# Patient Record
Sex: MALE | Race: WHITE | NOT HISPANIC OR LATINO
[De-identification: names, ages, dates, MRNs, and addresses within clinical notes are randomized per-mention and may not be internally consistent; named-entity substitution may affect disease eponyms.]

---

## 2017-04-10 PROBLEM — Z00.00 ENCOUNTER FOR PREVENTIVE HEALTH EXAMINATION: Status: ACTIVE | Noted: 2017-04-10

## 2017-06-15 ENCOUNTER — APPOINTMENT (OUTPATIENT)
Dept: INTERNAL MEDICINE | Facility: CLINIC | Age: 62
End: 2017-06-15

## 2017-06-15 ENCOUNTER — OTHER (OUTPATIENT)
Age: 62
End: 2017-06-15

## 2017-06-15 VITALS
SYSTOLIC BLOOD PRESSURE: 120 MMHG | TEMPERATURE: 98.3 F | WEIGHT: 202 LBS | DIASTOLIC BLOOD PRESSURE: 70 MMHG | OXYGEN SATURATION: 97 % | HEART RATE: 62 BPM | HEIGHT: 72 IN | RESPIRATION RATE: 18 BRPM | BODY MASS INDEX: 27.36 KG/M2

## 2017-06-15 DIAGNOSIS — Z78.9 OTHER SPECIFIED HEALTH STATUS: ICD-10-CM

## 2017-06-15 DIAGNOSIS — M54.9 DORSALGIA, UNSPECIFIED: ICD-10-CM

## 2017-06-15 DIAGNOSIS — Z80.0 FAMILY HISTORY OF MALIGNANT NEOPLASM OF DIGESTIVE ORGANS: ICD-10-CM

## 2017-06-15 DIAGNOSIS — Z82.49 FAMILY HISTORY OF ISCHEMIC HEART DISEASE AND OTHER DISEASES OF THE CIRCULATORY SYSTEM: ICD-10-CM

## 2017-06-15 DIAGNOSIS — M54.41 LUMBAGO WITH SCIATICA, RIGHT SIDE: ICD-10-CM

## 2017-06-15 DIAGNOSIS — Z80.42 FAMILY HISTORY OF MALIGNANT NEOPLASM OF PROSTATE: ICD-10-CM

## 2017-06-15 DIAGNOSIS — Z82.0 FAMILY HISTORY OF EPILEPSY AND OTHER DISEASES OF THE NERVOUS SYSTEM: ICD-10-CM

## 2017-06-15 RX ORDER — METHYLPREDNISOLONE 4 MG/1
4 TABLET ORAL
Qty: 1 | Refills: 0 | Status: COMPLETED | COMMUNITY
Start: 2017-06-15 | End: 2017-06-21

## 2017-06-15 RX ORDER — CEFUROXIME AXETIL 500 MG/1
500 TABLET ORAL
Qty: 8 | Refills: 0 | Status: COMPLETED | COMMUNITY
Start: 2017-03-28

## 2017-06-15 RX ORDER — CARISOPRODOL 250 MG/1
250 TABLET ORAL 3 TIMES DAILY
Qty: 30 | Refills: 0 | Status: COMPLETED | COMMUNITY
Start: 2017-06-15 | End: 2017-06-15

## 2017-06-15 RX ORDER — SILDENAFIL CITRATE 100 MG/1
100 TABLET, FILM COATED ORAL
Qty: 6 | Refills: 0 | Status: COMPLETED | COMMUNITY
Start: 2017-05-16

## 2017-06-15 RX ORDER — TADALAFIL 20 MG/1
20 TABLET, FILM COATED ORAL
Qty: 6 | Refills: 0 | Status: COMPLETED | COMMUNITY
Start: 2017-02-03

## 2017-06-15 RX ORDER — LEVOFLOXACIN 500 MG/1
500 TABLET, FILM COATED ORAL
Qty: 4 | Refills: 0 | Status: COMPLETED | COMMUNITY
Start: 2017-03-28

## 2017-09-18 ENCOUNTER — APPOINTMENT (OUTPATIENT)
Dept: INTERNAL MEDICINE | Facility: CLINIC | Age: 62
End: 2017-09-18
Payer: COMMERCIAL

## 2017-09-18 ENCOUNTER — MED ADMIN CHARGE (OUTPATIENT)
Age: 62
End: 2017-09-18

## 2017-09-18 VITALS
SYSTOLIC BLOOD PRESSURE: 130 MMHG | WEIGHT: 205 LBS | DIASTOLIC BLOOD PRESSURE: 82 MMHG | HEIGHT: 72 IN | OXYGEN SATURATION: 100 % | TEMPERATURE: 98 F | BODY MASS INDEX: 27.77 KG/M2 | HEART RATE: 56 BPM | RESPIRATION RATE: 18 BRPM

## 2017-09-18 DIAGNOSIS — Z23 ENCOUNTER FOR IMMUNIZATION: ICD-10-CM

## 2017-09-18 DIAGNOSIS — R76.11 NONSPECIFIC REACTION TO TUBERCULIN SKIN TEST W/OUT ACTIVE TUBERCULOSIS: ICD-10-CM

## 2017-09-18 DIAGNOSIS — Z00.00 ENCOUNTER FOR GENERAL ADULT MEDICAL EXAMINATION W/OUT ABNORMAL FINDINGS: ICD-10-CM

## 2017-09-18 PROCEDURE — G0008: CPT

## 2017-09-18 PROCEDURE — 90686 IIV4 VACC NO PRSV 0.5 ML IM: CPT | Mod: GA

## 2017-09-18 PROCEDURE — 99396 PREV VISIT EST AGE 40-64: CPT | Mod: 25

## 2017-09-18 RX ORDER — CYCLOBENZAPRINE HYDROCHLORIDE 10 MG/1
10 TABLET, FILM COATED ORAL
Qty: 30 | Refills: 0 | Status: COMPLETED | COMMUNITY
Start: 2017-06-15 | End: 2017-09-18

## 2017-09-18 RX ORDER — ASPIRIN 81 MG/1
TABLET, DELAYED RELEASE ORAL
Refills: 0 | Status: ACTIVE | COMMUNITY

## 2017-09-18 RX ORDER — ROSUVASTATIN CALCIUM 20 MG/1
20 TABLET, FILM COATED ORAL
Qty: 30 | Refills: 0 | Status: ACTIVE | COMMUNITY
Start: 2017-03-21

## 2017-09-21 ENCOUNTER — OTHER (OUTPATIENT)
Age: 62
End: 2017-09-21

## 2017-09-22 ENCOUNTER — OTHER (OUTPATIENT)
Age: 62
End: 2017-09-22

## 2018-01-02 ENCOUNTER — OTHER (OUTPATIENT)
Age: 63
End: 2018-01-02

## 2018-07-27 PROBLEM — Z78.9 ALCOHOL USE: Status: ACTIVE | Noted: 2017-06-15

## 2018-10-03 LAB
CHOLEST SERPL-MCNC: 129
GLUCOSE SERPL-MCNC: 93
HBA1C MFR BLD HPLC: 5.3
HDLC SERPL-MCNC: 44
LDLC SERPL CALC-MCNC: 65
PROSTATE SPECIFIC AG, SERUM: 2.03

## 2019-04-19 ENCOUNTER — RECORD ABSTRACTING (OUTPATIENT)
Age: 64
End: 2019-04-19

## 2019-04-19 DIAGNOSIS — Z80.0 FAMILY HISTORY OF MALIGNANT NEOPLASM OF DIGESTIVE ORGANS: ICD-10-CM

## 2019-04-19 RX ORDER — ELECTROLYTES/DEXTROSE
SOLUTION, ORAL ORAL
Refills: 0 | Status: ACTIVE | COMMUNITY

## 2019-05-14 ENCOUNTER — APPOINTMENT (OUTPATIENT)
Dept: GASTROENTEROLOGY | Facility: CLINIC | Age: 64
End: 2019-05-14

## 2019-07-16 ENCOUNTER — APPOINTMENT (OUTPATIENT)
Dept: ORTHOPEDIC SURGERY | Facility: CLINIC | Age: 64
End: 2019-07-16
Payer: MEDICARE

## 2019-07-16 VITALS
SYSTOLIC BLOOD PRESSURE: 174 MMHG | DIASTOLIC BLOOD PRESSURE: 79 MMHG | BODY MASS INDEX: 28.17 KG/M2 | HEART RATE: 55 BPM | HEIGHT: 72 IN | WEIGHT: 208 LBS

## 2019-07-16 DIAGNOSIS — Z86.39 PERSONAL HISTORY OF OTHER ENDOCRINE, NUTRITIONAL AND METABOLIC DISEASE: ICD-10-CM

## 2019-07-16 DIAGNOSIS — Z85.9 PERSONAL HISTORY OF MALIGNANT NEOPLASM, UNSPECIFIED: ICD-10-CM

## 2019-07-16 PROCEDURE — 99203 OFFICE O/P NEW LOW 30 MIN: CPT

## 2019-07-16 PROCEDURE — 73600 X-RAY EXAM OF ANKLE: CPT | Mod: TC,RT

## 2019-07-17 LAB
ALBUMIN SERPL ELPH-MCNC: 4.7 G/DL
ALP BLD-CCNC: 60 U/L
ALT SERPL-CCNC: 23 U/L
ANION GAP SERPL CALC-SCNC: 17 MMOL/L
APTT BLD: 30.5 SEC
AST SERPL-CCNC: 27 U/L
BASOPHILS # BLD AUTO: 0.03 K/UL
BASOPHILS NFR BLD AUTO: 0.4 %
BILIRUB SERPL-MCNC: 0.4 MG/DL
BUN SERPL-MCNC: 17 MG/DL
CALCIUM SERPL-MCNC: 8.9 MG/DL
CHLORIDE SERPL-SCNC: 106 MMOL/L
CO2 SERPL-SCNC: 20 MMOL/L
CREAT SERPL-MCNC: 1.17 MG/DL
EOSINOPHIL # BLD AUTO: 0.16 K/UL
EOSINOPHIL NFR BLD AUTO: 2.4 %
GLUCOSE SERPL-MCNC: 77 MG/DL
HCT VFR BLD CALC: 40.8 %
HGB BLD-MCNC: 12.9 G/DL
IMM GRANULOCYTES NFR BLD AUTO: 0.3 %
INR PPP: 1.01 RATIO
LYMPHOCYTES # BLD AUTO: 1.27 K/UL
LYMPHOCYTES NFR BLD AUTO: 18.8 %
MAN DIFF?: NORMAL
MCHC RBC-ENTMCNC: 30 PG
MCHC RBC-ENTMCNC: 31.6 GM/DL
MCV RBC AUTO: 94.9 FL
MONOCYTES # BLD AUTO: 0.65 K/UL
MONOCYTES NFR BLD AUTO: 9.6 %
NEUTROPHILS # BLD AUTO: 4.64 K/UL
NEUTROPHILS NFR BLD AUTO: 68.5 %
PLATELET # BLD AUTO: 246 K/UL
POTASSIUM SERPL-SCNC: 4.7 MMOL/L
PROT SERPL-MCNC: 7.3 G/DL
PT BLD: 11.6 SEC
RBC # BLD: 4.3 M/UL
RBC # FLD: 13.2 %
SODIUM SERPL-SCNC: 143 MMOL/L
WBC # FLD AUTO: 6.77 K/UL

## 2019-07-18 ENCOUNTER — APPOINTMENT (OUTPATIENT)
Dept: INTERNAL MEDICINE | Facility: CLINIC | Age: 64
End: 2019-07-18
Payer: MEDICARE

## 2019-07-18 ENCOUNTER — APPOINTMENT (OUTPATIENT)
Dept: ORTHOPEDIC SURGERY | Facility: CLINIC | Age: 64
End: 2019-07-18
Payer: MEDICARE

## 2019-07-18 ENCOUNTER — NON-APPOINTMENT (OUTPATIENT)
Age: 64
End: 2019-07-18

## 2019-07-18 VITALS
OXYGEN SATURATION: 97 % | HEART RATE: 66 BPM | RESPIRATION RATE: 16 BRPM | TEMPERATURE: 98.7 F | HEIGHT: 72 IN | DIASTOLIC BLOOD PRESSURE: 76 MMHG | WEIGHT: 211 LBS | SYSTOLIC BLOOD PRESSURE: 144 MMHG | BODY MASS INDEX: 28.58 KG/M2

## 2019-07-18 VITALS
TEMPERATURE: 98.1 F | WEIGHT: 211 LBS | BODY MASS INDEX: 28.58 KG/M2 | SYSTOLIC BLOOD PRESSURE: 161 MMHG | HEIGHT: 72 IN | DIASTOLIC BLOOD PRESSURE: 76 MMHG | HEART RATE: 55 BPM

## 2019-07-18 DIAGNOSIS — I25.10 ATHEROSCLEROTIC HEART DISEASE OF NATIVE CORONARY ARTERY W/OUT ANGINA PECTORIS: ICD-10-CM

## 2019-07-18 DIAGNOSIS — I10 ESSENTIAL (PRIMARY) HYPERTENSION: ICD-10-CM

## 2019-07-18 DIAGNOSIS — R06.02 SHORTNESS OF BREATH: ICD-10-CM

## 2019-07-18 DIAGNOSIS — D64.9 ANEMIA, UNSPECIFIED: ICD-10-CM

## 2019-07-18 DIAGNOSIS — S86.011A STRAIN OF RIGHT ACHILLES TENDON, INITIAL ENCOUNTER: ICD-10-CM

## 2019-07-18 DIAGNOSIS — E78.00 PURE HYPERCHOLESTEROLEMIA, UNSPECIFIED: ICD-10-CM

## 2019-07-18 DIAGNOSIS — J45.909 UNSPECIFIED ASTHMA, UNCOMPLICATED: ICD-10-CM

## 2019-07-18 PROCEDURE — 99215 OFFICE O/P EST HI 40 MIN: CPT | Mod: 25

## 2019-07-18 PROCEDURE — 99213 OFFICE O/P EST LOW 20 MIN: CPT

## 2019-07-18 PROCEDURE — 94060 EVALUATION OF WHEEZING: CPT

## 2019-07-18 NOTE — DATA REVIEWED
[FreeTextEntry1] : Spirometric analysis is within normal limits. Slight bronchodilator reactivity is demonstrated.\par \par Preoperative blood work is reviewed.\par \par EKG performed on 5/2/19 is reviewed. The patient is noted to have sinus bradycardia at a rate of 59. There are normal intervals. There is a slight left axis deviation. There are no acute changes seen.\par \par

## 2019-07-18 NOTE — PLAN
[FreeTextEntry1] : 1. Continued medication as outlined above.\par \par 2. The patient will followup in the near future for his yearly physical and wellness evaluation. He will need to have all of his vaccines updated.\par \par 3. Colonoscopy will be performed later in the year by Dr. reno.\par \par 4. Routine urologic followup with Dr. Nguyen\par \par 5. Await recommendations from Dr. Burton regarding the right Achilles tendon\par \par Addendum: There are no absolute medical contraindications to the planned procedure.

## 2019-07-18 NOTE — PHYSICAL EXAM
[No Acute Distress] : no acute distress [Well Nourished] : well nourished [Well Developed] : well developed [Well-Appearing] : well-appearing [Normal Sclera/Conjunctiva] : normal sclera/conjunctiva [PERRL] : pupils equal round and reactive to light [Normal Outer Ear/Nose] : the outer ears and nose were normal in appearance [Normal Oropharynx] : the oropharynx was normal [No JVD] : no jugular venous distention [No Lymphadenopathy] : no lymphadenopathy [Supple] : supple [No Respiratory Distress] : no respiratory distress  [No Accessory Muscle Use] : no accessory muscle use [Clear to Auscultation] : lungs were clear to auscultation bilaterally [Normal Rate] : normal rate  [Regular Rhythm] : with a regular rhythm [Normal S1, S2] : normal S1 and S2 [No Murmur] : no murmur heard [No Edema] : there was no peripheral edema [No Extremity Clubbing/Cyanosis] : no extremity clubbing/cyanosis [Soft] : abdomen soft [Non Tender] : non-tender [Non-distended] : non-distended [No Masses] : no abdominal mass palpated [No HSM] : no HSM [Normal Bowel Sounds] : normal bowel sounds [Normal Supraclavicular Nodes] : no supraclavicular lymphadenopathy [Normal Posterior Cervical Nodes] : no posterior cervical lymphadenopathy [Normal Anterior Cervical Nodes] : no anterior cervical lymphadenopathy [No CVA Tenderness] : no CVA  tenderness [No Spinal Tenderness] : no spinal tenderness [No Joint Swelling] : no joint swelling [No Rash] : no rash [Coordination Grossly Intact] : coordination grossly intact [No Focal Deficits] : no focal deficits [Normal Gait] : normal gait [Deep Tendon Reflexes (DTR)] : deep tendon reflexes were 2+ and symmetric [Normal Affect] : the affect was normal [Normal Insight/Judgement] : insight and judgment were intact [de-identified] : There is decreased range of motion of the right foot with discomfort in the Achilles region.

## 2019-07-23 RX ORDER — OXYCODONE AND ACETAMINOPHEN 5; 325 MG/1; MG/1
5-325 TABLET ORAL
Qty: 40 | Refills: 0 | Status: ACTIVE | COMMUNITY
Start: 2019-07-23 | End: 1900-01-01

## 2019-07-23 RX ORDER — FENTANYL CITRATE 50 UG/ML
50 INJECTION INTRAVENOUS
Refills: 0 | Status: DISCONTINUED | OUTPATIENT
Start: 2019-07-24 | End: 2019-07-24

## 2019-07-23 RX ORDER — SODIUM CHLORIDE 9 MG/ML
1000 INJECTION, SOLUTION INTRAVENOUS
Refills: 0 | Status: DISCONTINUED | OUTPATIENT
Start: 2019-07-24 | End: 2019-07-24

## 2019-07-23 RX ORDER — ONDANSETRON 8 MG/1
4 TABLET, FILM COATED ORAL ONCE
Refills: 0 | Status: DISCONTINUED | OUTPATIENT
Start: 2019-07-24 | End: 2019-07-24

## 2019-07-23 NOTE — DISCUSSION/SUMMARY
[de-identified] : The patient presents with a right Achilles tendon rupture.  The patient is going to be set up for a surgical procedure and follow up here two weeks after.  The surgical procedure is going to be discussed by Dr. Burton via telephone.\par \par The patient has been advised that their blood pressure today was outside normal ranges and the patient was instructed accordingly. Our Blood Pressure Monitoring Sheet with instructions was given to the patient.\par

## 2019-07-23 NOTE — ADDENDUM
[FreeTextEntry1] : This note was written by Brenda Van on 07/19/2019 acting as scribe for Kitty Rivas, GENEVIEVER/ERMIAS, PA.\par

## 2019-07-23 NOTE — PHYSICAL EXAM
[de-identified] : Right ankle:\par Ankle: Range of Motion in Degrees:\par 	                                Claimant:	                Normal:	\par Dorsiflexion (Active)	40-degrees	40-degrees	\par Dorsiflexion (Passive)	40-degrees	40-degrees	\par Plantar (Active)	                40-degrees	40-degrees	\par Plantar (Passive)	                40-degrees	40-degrees	\par Inversion (Active)	                30-degrees	30-degrees	\par Inversion (Passive)	                30-degrees	30-degrees	\par Eversion  (Active)	                20-degrees	20-degrees	\par Eversion (Passive) 	                20-degrees	20-degrees	\par \par No weakness in dorsiflexion, plantar flexion, inversion or eversion.  Normal sensation.  Positive tenderness in the posterior tendon.  Diffuse ecchymosis and swelling.  Positive Barone’s test with a palpable defect in the mid Achilles.  No tenderness over the medial or lateral ligaments.  No tenderness over the DLES.  No evidence of instability.  No medial or lateral bony tenderness.  No proximal fibular tenderness.  No anterior, medial or lateral tendon tenderness.  No intra-articular swelling.  No tenderness over the plantar aspect of the os calcis.  Negative anterior drawer sign.  Skin is intact.  No rashes, scars or lesions. \par \par No major calf tenderness.  He does have some swelling.  Good distal pulses\par Left ankle:\par Ankle: Range of Motion in Degrees:\par 	                                Claimant:	                Normal:	\par Dorsiflexion (Active)	40-degrees	40-degrees	\par Dorsiflexion (Passive)	40-degrees	40-degrees	\par Plantar (Active)	                40-degrees	40-degrees	\par Plantar (Passive)	                40-degrees	40-degrees	\par Inversion (Active)	                30-degrees	30-degrees	\par Inversion (Passive)	                30-degrees	30-degrees	\par Eversion  (Active)	                20-degrees	20-degrees	\par Eversion (Passive) 	                20-degrees	20-degrees	\par \par No weakness in dorsiflexion, plantar flexion, inversion or eversion.  Normal sensation.  No tenderness over the medial or lateral ligaments.  No tenderness over the DLES.  No evidence of instability.  Negative anterior drawer sign.  No medial or lateral bony tenderness.  No proximal fibular tenderness.  No anterior, posterior, medial or lateral tendon tenderness.  No intra-articular swelling.  No extra-articular swelling, edema or tenderness.  No tenderness over the plantar aspect of the os calcis.  2+ DP and PT pulses. Skin is intact.  No rashes, scars or lesions.  \par   [de-identified] : Gait: Slightly antalgic to antalgic gait.  Station: Normal  [de-identified] : Appearance: Well-developed, well-nourished male  in no acute distress.  [de-identified] : X-ray examination, two views of the right ankle, reveals there is a heel spur.  No acute fractures or dislocations noted.

## 2019-07-23 NOTE — HISTORY OF PRESENT ILLNESS
[1] : the ailment interference is 1/10 [9] : the ailment interference is 9/10 [10  (interferes completely)] : the ailment interference is10/10 (interferes completely) [de-identified] : Bjorn comes in today status post an injury playing pickleball.  He states that he stepped the wrong way and he heard a pop behind his leg.  The patient states the onset/injury occurred on 7/13/19.  This injury is not work related or due to an automobile accident.  The patient states the pain is intermittent and localized.  The patient describes the pain as dull. The patient states rest makes the symptoms better while walking makes the symptoms worse. [] : No

## 2019-07-24 ENCOUNTER — APPOINTMENT (OUTPATIENT)
Dept: ORTHOPEDIC SURGERY | Facility: HOSPITAL | Age: 64
End: 2019-07-24

## 2019-07-24 ENCOUNTER — OUTPATIENT (OUTPATIENT)
Dept: OUTPATIENT SERVICES | Facility: HOSPITAL | Age: 64
LOS: 1 days | Discharge: HOME | End: 2019-07-24
Payer: MEDICARE

## 2019-07-24 VITALS
DIASTOLIC BLOOD PRESSURE: 77 MMHG | HEART RATE: 61 BPM | TEMPERATURE: 98 F | SYSTOLIC BLOOD PRESSURE: 167 MMHG | OXYGEN SATURATION: 100 % | RESPIRATION RATE: 12 BRPM

## 2019-07-24 VITALS
SYSTOLIC BLOOD PRESSURE: 158 MMHG | TEMPERATURE: 98 F | OXYGEN SATURATION: 99 % | RESPIRATION RATE: 16 BRPM | DIASTOLIC BLOOD PRESSURE: 74 MMHG | WEIGHT: 210.98 LBS | HEART RATE: 58 BPM | HEIGHT: 72 IN

## 2019-07-24 DIAGNOSIS — Z98.890 OTHER SPECIFIED POSTPROCEDURAL STATES: Chronic | ICD-10-CM

## 2019-07-24 DIAGNOSIS — S86.011A STRAIN OF RIGHT ACHILLES TENDON, INITIAL ENCOUNTER: ICD-10-CM

## 2019-07-24 LAB
ANION GAP SERPL CALC-SCNC: 6 MMOL/L — SIGNIFICANT CHANGE UP (ref 5–17)
APTT BLD: 31.5 SEC — SIGNIFICANT CHANGE UP (ref 27.5–36.3)
BUN SERPL-MCNC: 17 MG/DL — SIGNIFICANT CHANGE UP (ref 7–23)
CALCIUM SERPL-MCNC: 9 MG/DL — SIGNIFICANT CHANGE UP (ref 8.5–10.1)
CHLORIDE SERPL-SCNC: 108 MMOL/L — SIGNIFICANT CHANGE UP (ref 96–108)
CO2 SERPL-SCNC: 27 MMOL/L — SIGNIFICANT CHANGE UP (ref 22–31)
CREAT SERPL-MCNC: 1.28 MG/DL — SIGNIFICANT CHANGE UP (ref 0.5–1.3)
GLUCOSE SERPL-MCNC: 90 MG/DL — SIGNIFICANT CHANGE UP (ref 70–99)
HCT VFR BLD CALC: 39.8 % — SIGNIFICANT CHANGE UP (ref 39–50)
HGB BLD-MCNC: 13.1 G/DL — SIGNIFICANT CHANGE UP (ref 13–17)
INR BLD: 1.08 RATIO — SIGNIFICANT CHANGE UP (ref 0.88–1.16)
MCHC RBC-ENTMCNC: 29.5 PG — SIGNIFICANT CHANGE UP (ref 27–34)
MCHC RBC-ENTMCNC: 32.9 GM/DL — SIGNIFICANT CHANGE UP (ref 32–36)
MCV RBC AUTO: 89.6 FL — SIGNIFICANT CHANGE UP (ref 80–100)
PLATELET # BLD AUTO: 236 K/UL — SIGNIFICANT CHANGE UP (ref 150–400)
POTASSIUM SERPL-MCNC: 4.1 MMOL/L — SIGNIFICANT CHANGE UP (ref 3.5–5.3)
POTASSIUM SERPL-SCNC: 4.1 MMOL/L — SIGNIFICANT CHANGE UP (ref 3.5–5.3)
PROTHROM AB SERPL-ACNC: 12 SEC — SIGNIFICANT CHANGE UP (ref 10–12.9)
RBC # BLD: 4.44 M/UL — SIGNIFICANT CHANGE UP (ref 4.2–5.8)
RBC # FLD: 12.7 % — SIGNIFICANT CHANGE UP (ref 10.3–14.5)
SODIUM SERPL-SCNC: 141 MMOL/L — SIGNIFICANT CHANGE UP (ref 135–145)
WBC # BLD: 6.61 K/UL — SIGNIFICANT CHANGE UP (ref 3.8–10.5)
WBC # FLD AUTO: 6.61 K/UL — SIGNIFICANT CHANGE UP (ref 3.8–10.5)

## 2019-07-24 PROCEDURE — 93010 ELECTROCARDIOGRAM REPORT: CPT

## 2019-07-24 PROCEDURE — 27650 REPAIR ACHILLES TENDON: CPT | Mod: RT

## 2019-07-24 RX ORDER — ASPIRIN/CALCIUM CARB/MAGNESIUM 324 MG
1 TABLET ORAL
Qty: 0 | Refills: 0 | DISCHARGE

## 2019-07-24 RX ORDER — OXYCODONE HYDROCHLORIDE 5 MG/1
10 TABLET ORAL ONCE
Refills: 0 | Status: DISCONTINUED | OUTPATIENT
Start: 2019-07-24 | End: 2019-07-24

## 2019-07-24 RX ORDER — ROSUVASTATIN CALCIUM 5 MG/1
1 TABLET ORAL
Qty: 0 | Refills: 0 | DISCHARGE

## 2019-07-24 RX ORDER — ASPIRIN/CALCIUM CARB/MAGNESIUM 324 MG
1 TABLET ORAL
Qty: 21 | Refills: 0
Start: 2019-07-24 | End: 2019-08-13

## 2019-07-24 RX ADMIN — SODIUM CHLORIDE 75 MILLILITER(S): 9 INJECTION, SOLUTION INTRAVENOUS at 11:58

## 2019-07-24 RX ADMIN — OXYCODONE HYDROCHLORIDE 10 MILLIGRAM(S): 5 TABLET ORAL at 12:24

## 2019-07-24 RX ADMIN — OXYCODONE HYDROCHLORIDE 10 MILLIGRAM(S): 5 TABLET ORAL at 13:51

## 2019-07-24 NOTE — ASU DISCHARGE PLAN (ADULT/PEDIATRIC) - ACTIVITY LEVEL
Non weight bearing right lower extremity in posterior splint No excercise/Non weight bearing right lower extremity in posterior splint

## 2019-07-24 NOTE — ASU PATIENT PROFILE, ADULT - PSH
H/O left inguinal hernia repair  2002  History of repair of right rotator cuff  2001  S/P rotator cuff repair  left  5/8/2019

## 2019-07-24 NOTE — ASU DISCHARGE PLAN (ADULT/PEDIATRIC) - ASU DC SPECIAL INSTRUCTIONSFT
Follow up with Dr Burton in 7 days.   Call office for appointment.   Take medications as prescribed.   Take ASPIRIN 325 mg daily for 21 days, DO NOT SKIP DOSES, to prevent blood clots    Non weight bearing of the right lower extremity in posterior splint, with assistive devices as needed  Staples to be removed in the office    Keep splint clean, dry, and intact. Rest, ice, and elevate affected extremity. Follow up with Dr Burton in 7 days.   Call office for appointment.   Take medications as prescribed.   Take ASPIRIN 325 mg daily for 21 days, DO NOT SKIP DOSES, to prevent blood clots  May continue aspirin 81 once daily aspirin prescription is completed    Non weight bearing of the right lower extremity in posterior splint, with assistive devices as needed  Staples to be removed in the office    Keep splint clean, dry, and intact. Rest, ice, and elevate affected extremity.

## 2019-07-24 NOTE — ASU DISCHARGE PLAN (ADULT/PEDIATRIC) - CALL YOUR DOCTOR IF YOU HAVE ANY OF THE FOLLOWING:
Pain not relieved by Medications/Wound/Surgical Site with redness, or foul smelling discharge or pus/Numbness, tingling, color or temperature change to extremity/Swelling that gets worse/Bleeding that does not stop

## 2019-07-24 NOTE — ASU DISCHARGE PLAN (ADULT/PEDIATRIC) - CARE PROVIDER_API CALL
Bar Burton)  Orthopaedic Surgery  [address] [place] [address]  Phone: [phone]  Fax: [phone]  Follow Up Time:

## 2019-07-25 PROBLEM — E78.5 HYPERLIPIDEMIA, UNSPECIFIED: Chronic | Status: ACTIVE | Noted: 2019-07-24

## 2019-07-26 NOTE — PHYSICAL EXAM
[de-identified] : Right Achilles:\par There is a palpable defect distal third of his right  Achilles.  Positive Barone test.  No gross neurologic or vascular deficits distally.  \par \par Left ankle:\par Ankle: Range of Motion in Degrees:\par 	                                Claimant:	                Normal:	\par Dorsiflexion (Active)	40-degrees	40-degrees	\par Dorsiflexion (Passive)	40-degrees	40-degrees	\par Plantar (Active)	                40-degrees	40-degrees	\par Plantar (Passive)	                40-degrees	40-degrees	\par Inversion (Active)	                30-degrees	30-degrees	\par Inversion (Passive)	                30-degrees	30-degrees	\par Eversion  (Active)	                20-degrees	20-degrees	\par Eversion (Passive) 	                20-degrees	20-degrees	\par \par No weakness in dorsiflexion, plantar flexion, inversion or eversion.  Normal sensation.  No tenderness over the medial or lateral ligaments.  No tenderness over the DLES.  No evidence of instability.  Negative anterior drawer sign.  No medial or lateral bony tenderness.  No proximal fibular tenderness.  No anterior, posterior, medial or lateral tendon tenderness.  No intra-articular swelling.  No extra-articular swelling, edema or tenderness.  No tenderness over the plantar aspect of the os calcis.  2+ DP and PT pulses. Skin is intact.  No rashes, scars or lesions.  \par   [de-identified] : Gait: Slightly antalgic to antalgic gait.  Station: Normal  [de-identified] : Appearance: Well-developed, well-nourished male  in no acute distress.

## 2019-07-26 NOTE — DISCUSSION/SUMMARY
[de-identified] : The patient presents with a right Achilles tendon tear.  At this time I have recommended that he get an MRI prior to surgical intervention just to assess how distal the tear is to help with our surgical plan.  All the risks and benefits of the surgery, including, but not limited to, anesthesia, infection, nerve and/or vascular injury and need for further surgery, as well as poor wound healing and infection rate is higher with an Achilles tendon repair, were all discussed with the patient at length.  In light of these, he wishes to proceed.  He will be scheduled at this time. \par \par The patient has been advised that their blood pressure today was outside normal ranges and the patient was instructed accordingly. Our Blood Pressure Monitoring Sheet with instructions was given to the patient.\par

## 2019-07-26 NOTE — HISTORY OF PRESENT ILLNESS
[de-identified] : Bjorn comes in today.  He has been seen and indicated with his Achilles tendon rupture.

## 2019-07-26 NOTE — ADDENDUM
[FreeTextEntry1] : This note was written by Brenda Van on 07/23/2019 acting as scribe for Bar Burton III, MD
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
## 2019-07-30 DIAGNOSIS — S86.011A STRAIN OF RIGHT ACHILLES TENDON, INITIAL ENCOUNTER: ICD-10-CM

## 2019-07-30 DIAGNOSIS — S76.111A STRAIN OF RIGHT QUADRICEPS MUSCLE, FASCIA AND TENDON, INITIAL ENCOUNTER: ICD-10-CM

## 2019-07-30 DIAGNOSIS — E78.00 PURE HYPERCHOLESTEROLEMIA, UNSPECIFIED: ICD-10-CM

## 2019-07-30 DIAGNOSIS — I10 ESSENTIAL (PRIMARY) HYPERTENSION: ICD-10-CM

## 2019-07-30 DIAGNOSIS — Z85.9 PERSONAL HISTORY OF MALIGNANT NEOPLASM, UNSPECIFIED: ICD-10-CM
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
## 2019-07-30 DIAGNOSIS — Z79.82 LONG TERM (CURRENT) USE OF ASPIRIN: ICD-10-CM

## 2019-07-30 DIAGNOSIS — D64.9 ANEMIA, UNSPECIFIED: ICD-10-CM

## 2019-07-30 DIAGNOSIS — I25.10 ATHEROSCLEROTIC HEART DISEASE OF NATIVE CORONARY ARTERY WITHOUT ANGINA PECTORIS: ICD-10-CM

## 2019-07-30 DIAGNOSIS — J45.909 UNSPECIFIED ASTHMA, UNCOMPLICATED: ICD-10-CM
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
## 2019-07-31 ENCOUNTER — APPOINTMENT (OUTPATIENT)
Dept: ORTHOPEDIC SURGERY | Facility: CLINIC | Age: 64
End: 2019-07-31
Payer: MEDICARE

## 2019-07-31 VITALS
WEIGHT: 210 LBS | TEMPERATURE: 98.1 F | HEIGHT: 72 IN | SYSTOLIC BLOOD PRESSURE: 170 MMHG | DIASTOLIC BLOOD PRESSURE: 71 MMHG | BODY MASS INDEX: 28.44 KG/M2 | HEART RATE: 61 BPM

## 2019-07-31 PROCEDURE — 99024 POSTOP FOLLOW-UP VISIT: CPT

## 2019-08-08 ENCOUNTER — APPOINTMENT (OUTPATIENT)
Dept: ORTHOPEDIC SURGERY | Facility: CLINIC | Age: 64
End: 2019-08-08
Payer: MEDICARE

## 2019-08-08 VITALS — BODY MASS INDEX: 28.44 KG/M2 | WEIGHT: 210 LBS | HEIGHT: 72 IN

## 2019-08-08 PROCEDURE — 99024 POSTOP FOLLOW-UP VISIT: CPT

## 2019-08-12 NOTE — HISTORY OF PRESENT ILLNESS
[de-identified] : Status post right Achilles tendon repair [de-identified] : ROSIE DALAL is a 64-year-old male who comes in today status post a right Achilles tendon repair on 7/24/19.  He states he is feeling much better. [de-identified] : Right Ankle: \par Wound is healing with no sign of infection.  Sutures are removed. [de-identified] : Status post right Achilles tendon repair [de-identified] : Patient is doing well status post right Achilles tendon repair.  At this time, he will be placed in a CAM boot, non-weightbearing.  He will be reassessed in two weeks.

## 2019-08-12 NOTE — ADDENDUM
[FreeTextEntry1] : This note was written by Yuli Felder on 08/11/2019 acting as scribe for Bar Burton III, MD\par

## 2019-08-22 ENCOUNTER — APPOINTMENT (OUTPATIENT)
Dept: ORTHOPEDIC SURGERY | Facility: CLINIC | Age: 64
End: 2019-08-22
Payer: MEDICARE

## 2019-08-22 VITALS
SYSTOLIC BLOOD PRESSURE: 163 MMHG | HEART RATE: 53 BPM | WEIGHT: 210 LBS | DIASTOLIC BLOOD PRESSURE: 76 MMHG | BODY MASS INDEX: 28.44 KG/M2 | TEMPERATURE: 98.6 F | HEIGHT: 72 IN

## 2019-08-22 DIAGNOSIS — M70.41 PREPATELLAR BURSITIS, RIGHT KNEE: ICD-10-CM

## 2019-08-22 PROCEDURE — 99024 POSTOP FOLLOW-UP VISIT: CPT

## 2019-08-29 NOTE — HISTORY OF PRESENT ILLNESS
[de-identified] : The patient is status post right Achilles tendon repair on 07/24/19.  She states he feels great. [de-identified] : Postop Right Ankle [de-identified] : Right Ankle:\par Wound is well-healed with no signs of infection.  Achilles is intact.   [de-identified] : Status post right Achilles tendon repair  [de-identified] : The patient is progressing nicely status post right Achilles tendon repair.  At this time, I instructed the patient on range of motion and strengthening exercises.  He will be nonweightbearing for two weeks.  \par \par The patient has been advised that their blood pressure today was outside normal ranges and the patient was instructed accordingly. Our Blood Pressure Monitoring Sheet with instructions was given to the patient.\par

## 2019-08-29 NOTE — ADDENDUM
[FreeTextEntry1] : This note was written by Yenni Juárez on 08/27/2019 acting as scribe for Bar Burton III, MD

## 2019-09-03 ENCOUNTER — APPOINTMENT (OUTPATIENT)
Dept: ORTHOPEDIC SURGERY | Facility: CLINIC | Age: 64
End: 2019-09-03
Payer: MEDICARE

## 2019-09-03 VITALS
HEIGHT: 72 IN | WEIGHT: 210 LBS | BODY MASS INDEX: 28.44 KG/M2 | TEMPERATURE: 98 F | SYSTOLIC BLOOD PRESSURE: 168 MMHG | DIASTOLIC BLOOD PRESSURE: 79 MMHG | HEART RATE: 58 BPM

## 2019-09-03 PROCEDURE — 99024 POSTOP FOLLOW-UP VISIT: CPT

## 2019-09-10 NOTE — HISTORY OF PRESENT ILLNESS
[de-identified] : The patient comes in today status post right Achilles tendon repair.  The patient is approximately 6 weeks out.  He is doing excellent. [de-identified] : Post-op Right Ankle [de-identified] : Right Ankle:  The incision is clean, dry and intact.  No signs of infection.  He has good distal pulses.  No calf tenderness.   [de-identified] : Status post right Achilles tendon repair [de-identified] : At this time, since the patient is doing well status post right Achilles tendon repair, the patient is going to be weaned out of the boot over the next 2 weeks, start physical therapy and return to the office in 2 weeks.\par \par The patient has been advised that their blood pressure today was outside normal ranges and the patient was instructed accordingly. Our Blood Pressure Monitoring Sheet with instructions was given to the patient.\par

## 2019-09-10 NOTE — ADDENDUM
[FreeTextEntry1] : This note was written by Barron Hernandez on 09/10/2019, acting as a scribe for MARY BENTON, PERRI/ERMIAS PA

## 2019-09-18 ENCOUNTER — APPOINTMENT (OUTPATIENT)
Dept: ORTHOPEDIC SURGERY | Facility: CLINIC | Age: 64
End: 2019-09-18
Payer: MEDICARE

## 2019-09-18 VITALS
HEIGHT: 72 IN | HEART RATE: 55 BPM | WEIGHT: 214 LBS | SYSTOLIC BLOOD PRESSURE: 180 MMHG | TEMPERATURE: 98.2 F | DIASTOLIC BLOOD PRESSURE: 75 MMHG | BODY MASS INDEX: 28.99 KG/M2

## 2019-09-18 PROCEDURE — 99024 POSTOP FOLLOW-UP VISIT: CPT

## 2019-09-23 NOTE — HISTORY OF PRESENT ILLNESS
[de-identified] : Bjorn comes in today status post a right Achilles repair.  He is one-week for a wound check. [de-identified] : Status post right Achilles tendon repair [de-identified] : Right ankle:\par The wound is healing well.  No sign of infection.   [de-identified] : The patient is doing well status post right Achilles tendon repair.  At this time continue his current modalities.  He will be reassessed in one week for suture removal.\par \par The patient has been advised that their blood pressure today was outside normal ranges and the patient was instructed accordingly. Our Blood Pressure Monitoring Sheet with instructions was given to the patient.\par   [de-identified] : Status post right Achilles tendon repair.

## 2019-09-23 NOTE — ADDENDUM
[FreeTextEntry1] : This note was written by Brenda Van on 08/05/2019 acting as scribe for Bar Burton III, MD \par \par Addendum 9/20/19 - A long CAM boot was prescribed on 7/31/19.

## 2019-09-26 NOTE — HISTORY OF PRESENT ILLNESS
[de-identified] : Postop Right Ankle [de-identified] : The patient comes in today status post right Achilles tendon repair.  He states he is feeling great. [de-identified] : Right Ankle:  Range of Motion in Degrees:\par 	                                Claimant:	                Normal:	\par Dorsiflexion (Active)	40-degrees	40-degrees	\par Dorsiflexion (Passive)	40-degrees	40-degrees	\par Plantar (Active)	                40-degrees	40-degrees	\par Plantar (Passive)	                40-degrees	40-degrees	\par Inversion (Active)	                30-degrees	30-degrees	\par Inversion (Passive)	                30-degrees	30-degrees	\par Eversion  (Active)	                20-degrees	20-degrees	\par Eversion (Passive) 	                20-degrees	20-degrees	\par \par Wounds are well-healed.  Tendon is intact.  Mild weakness and atrophy.  [de-identified] : The patient is progressing well status post right Achilles tendon repair.  At this time, the patient will continue therapy and be reassessed in six weeks.\par \par The patient has been advised that their blood pressure today was outside normal ranges and the patient was instructed accordingly. Our Blood Pressure Monitoring Sheet with instructions was given to the patient.\par   [de-identified] : Status post right Achilles tendon repair

## 2019-09-26 NOTE — ADDENDUM
[FreeTextEntry1] : This note was written by Yenni Juárez on 09/26/2019 acting as scribe for Bar Burton III, MD

## 2019-10-16 ENCOUNTER — APPOINTMENT (OUTPATIENT)
Dept: ORTHOPEDIC SURGERY | Facility: CLINIC | Age: 64
End: 2019-10-16
Payer: MEDICARE

## 2019-10-16 VITALS
HEIGHT: 72 IN | SYSTOLIC BLOOD PRESSURE: 129 MMHG | DIASTOLIC BLOOD PRESSURE: 74 MMHG | WEIGHT: 211 LBS | BODY MASS INDEX: 28.58 KG/M2 | HEART RATE: 61 BPM | TEMPERATURE: 98 F

## 2019-10-16 DIAGNOSIS — Z98.890 OTHER SPECIFIED POSTPROCEDURAL STATES: ICD-10-CM

## 2019-10-16 DIAGNOSIS — M25.561 PAIN IN RIGHT KNEE: ICD-10-CM

## 2019-10-16 PROCEDURE — 99214 OFFICE O/P EST MOD 30 MIN: CPT | Mod: 24

## 2019-10-16 PROCEDURE — 73560 X-RAY EXAM OF KNEE 1 OR 2: CPT | Mod: RT

## 2019-10-23 ENCOUNTER — APPOINTMENT (OUTPATIENT)
Dept: ORTHOPEDIC SURGERY | Facility: CLINIC | Age: 64
End: 2019-10-23
Payer: MEDICARE

## 2019-10-23 VITALS
HEART RATE: 57 BPM | BODY MASS INDEX: 28.44 KG/M2 | TEMPERATURE: 98.2 F | DIASTOLIC BLOOD PRESSURE: 74 MMHG | SYSTOLIC BLOOD PRESSURE: 151 MMHG | WEIGHT: 210 LBS | HEIGHT: 72 IN

## 2019-10-23 DIAGNOSIS — S72.434A: ICD-10-CM

## 2019-10-23 PROCEDURE — 99213 OFFICE O/P EST LOW 20 MIN: CPT

## 2019-10-24 PROBLEM — Z98.890 S/P ACHILLES TENDON REPAIR: Status: ACTIVE | Noted: 2019-07-31

## 2019-10-24 NOTE — REASON FOR VISIT
[FreeTextEntry2] : a postop visit for his right Achilles tendon and a new concern for his right knee

## 2019-10-24 NOTE — PHYSICAL EXAM
[de-identified] : Left Knee: \par Range of Motion in Degrees	\par 	                  Claimant/Normal:\par 		\par Flexion Active            135                        135-degrees\par Flexion Passive	  135	                135-degrees	\par Extension Active	  0-5	                0-5-degrees	\par Extension Passive	  0-5	                0-5-degrees	\par \par No weakness to flexion/extension.  No evidence of instability in the AP plane or varus or valgus stress.  Negative  Lachman.  Negative pivot shift.  Negative anterior drawer test.  Negative posterior drawer test.  Negative Aroldo.  Negative Apley grind.  No medial or lateral joint line tenderness.  No tenderness over the medial and lateral facet of the patella.  No patellofemoral crepitations.  No lateral tilting patella.  No patellar apprehension.  No crepitation in the medial and lateral femoral condyle.  No proximal or distal swelling, edema or tenderness.  No gross motor or sensory deficits.  No intra-articular swelling.  2+ DP and PT pulses. No varus or valgus malalignment.  Skin is intact.  No rashes, scars or lesions.  \par  \par Right Knee: \par Range of Motion in Degrees	\par 	                  Claimant:	Normal:	\par Flexion Active	  135 	                135-degrees	\par Flexion Passive	  135	                135-degrees	\par Extension Active	  0-5	                0-5-degrees	\par Extension Passive	  0-5	                0-5-degrees	\par \par No weakness to flexion/extension.  No evidence of instability in the AP plane or varus or valgus stress.  Negative  Lachman.  Negative pivot shift.  Negative anterior drawer test.  Negative posterior drawer test.  Positive medial joint line tenderness.  Negative lateral joint line tenderness.  Positive Aroldo.  Positive Apley grind.  No tenderness over the medial and lateral facet of the patella.  No patellofemoral crepitations.  No lateral tilting patella.  No patella apprehension.  No crepitation in the medial and lateral femoral condyle.  No proximal or distal swelling, edema or tenderness.  No gross motor or sensory deficits.  Mild intra-articular swelling.  2+ DP and PT pulses.  No varus or valgus malalignment.  Skin is intact.  No rashes, scars or lesions.   \par  \par Right Ankle: \par Range of Motion in Degrees:\par 	 Claimant:	 Normal:	\par Dorsiflexion (Active)	40-degrees	40-degrees	\par Dorsiflexion (Passive)	40-degrees	40-degrees	\par Plantar (Active)	 40-degrees	40-degrees	\par Plantar (Passive)	 40-degrees	40-degrees	\par Inversion (Active)	 30-degrees	30-degrees	\par Inversion (Passive)	 30-degrees	30-degrees	\par Eversion (Active)	 20-degrees	20-degrees	\par Eversion (Passive) 	 20-degrees	20-degrees	\par \par Wounds are well-healed. Tendon is intact.\par  [de-identified] : General Appearance:  Well-developed, well-nourished male in no acute distress. \par  [de-identified] : Ambulating with a slightly antalgic to antalgic gait.  Station:  Normal.  [de-identified] : Radiographs, two views of the right knee, show mild degenerative change.

## 2019-10-24 NOTE — HISTORY OF PRESENT ILLNESS
[de-identified] : The patient comes in today.  He is status post a right Achilles tendon repair on 7/24/19.  He states his right ankle is feeling great.  He is having some increasing complaints of pain to the knee catching, popping and locking on the right.   [None] : The patient is currently asymptomatic [FreeTextEntry1] : Timmy is a 13 year old boy with insomnia and delayed sleep phase syndrome. Has been taking RemFresh melatonin and it worked for a few weeks but now wakes up in middle of the night and can't go back to sleep. Takes melatonin around 9:00 pm and wakes up between 2-3 in the morning and cant go back to sleep. He does take a 3-4 hour nap during the day and he still feels tired when he gets up. Also taking 2 mg Abilify.\danielle Was seen by ortho because has been having leg pain for many years. 8 years ago had an MRI- overstress on bones and growing pains. Wants info about RLS. \par When he wakes up in the morning his legs hurt and if stays still for long his legs hurt more.\par He still has nightmares but not as frequent.

## 2019-10-24 NOTE — ADDENDUM
[FreeTextEntry1] : This note was written by Yuli Felder on 10/24/2019 acting as scribe for Bar Burton III, MD

## 2019-10-24 NOTE — DISCUSSION/SUMMARY
[de-identified] : At this time, I have recommended an MRI of the right knee to assess for a meniscal tear.  He will be reassessed after the MRI.

## 2019-10-31 NOTE — DISCUSSION/SUMMARY
[de-identified] : At this time, due to subchondral fracture of the medial femoral condyle of the right knee, I recommended a Playmaker brace, weight bearing as tolerated.  He will be reassessed in four to six weeks.\par \par The patient was prescribed a rigid support Playmaker knee brace with range of motion joints.  The brace will safely protect the patient and help to facilitate healing by stabilizing and controlling the knee.  In order to prevent skin breakdown along bony prominences and to avoid compression of the peroneal nerve, a custom fit is necessary.\par  \par The patient has been advised that their blood pressure today was outside normal ranges and the patient was instructed accordingly. Our Blood Pressure Monitoring Sheet with instructions was given to the patient.\par

## 2019-10-31 NOTE — ADDENDUM
[FreeTextEntry1] : This note was written by Bonnie Chan on 10/30/2019 acting as a scribe for ROMAINE GARCIA III, MD

## 2019-10-31 NOTE — PHYSICAL EXAM
[de-identified] : Right Knee: \par Range of Motion in Degrees	\par 	  Claimant:	Normal:	\par Flexion Active	 135 	 135-degrees	\par Flexion Passive	 135	 135-degrees	\par Extension Active	 0-5	 0-5-degrees	\par Extension Passive	 0-5	 0-5-degrees	\par \par No weakness to flexion/extension. No evidence of instability in the AP plane or varus or valgus stress. Negative Lachman. Negative pivot shift. Negative anterior drawer test. Negative posterior drawer test. Positive medial joint line tenderness. Negative lateral joint line tenderness. Positive Aroldo. Positive Apley grind. No tenderness over the medial and lateral facet of the patella. No patellofemoral crepitations. No lateral tilting patella. No patella apprehension. No crepitation in the medial and lateral femoral condyle. No proximal or distal swelling, edema or tenderness. No gross motor or sensory deficits. Mild intra-articular swelling. 2+ DP and PT pulses. No varus or valgus malalignment. Skin is intact. No rashes, scars or lesions. \par   [de-identified] : Ambulating with a slightly antalgic to antalgic gait.  Station:  Normal.  [de-identified] : Appearance:  Well-developed, well-nourished male in no acute distress.\par \par \par  [de-identified] : Review of his MRI is consistent with subchondral fracture of the medial femoral condyle.

## 2019-11-18 ENCOUNTER — APPOINTMENT (OUTPATIENT)
Dept: ORTHOPEDIC SURGERY | Facility: CLINIC | Age: 64
End: 2019-11-18
Payer: MEDICARE

## 2019-11-18 VITALS
SYSTOLIC BLOOD PRESSURE: 121 MMHG | HEART RATE: 62 BPM | DIASTOLIC BLOOD PRESSURE: 72 MMHG | TEMPERATURE: 98.5 F | HEIGHT: 72 IN | WEIGHT: 210 LBS | BODY MASS INDEX: 28.44 KG/M2

## 2019-11-18 DIAGNOSIS — S72.434D: ICD-10-CM

## 2019-11-18 PROCEDURE — 99212 OFFICE O/P EST SF 10 MIN: CPT

## 2019-11-21 NOTE — PHYSICAL EXAM
[de-identified] : Right Knee: \par Range of Motion in Degrees	\par 	                  Claimant:	Normal:	\par Flexion Active	  135 	                135-degrees	\par Flexion Passive	  135	                135-degrees	\par Extension Active	  0-5	                0-5-degrees	\par Extension Passive	  0-5	                0-5-degrees	\par \par No weakness to flexion/extension.  No evidence of instability in the AP plane or varus or valgus stress.  Negative  Lachman.  Negative pivot shift.  Negative anterior drawer test.  Negative posterior drawer test.  Negative Aroldo.  Negative Apley grind.  No medial or lateral joint line tenderness.  No tenderness over the medial and lateral facet of the patella.  No patellofemoral crepitations.  No lateral tilting patella.  No patellar apprehension.  No crepitation in the medial and lateral femoral condyle.  No proximal or distal swelling, edema or tenderness.  No gross motor or sensory deficits.  No intra-articular swelling.  2+ DP and PT pulses. No varus or valgus malalignment.  Skin is intact.  No rashes, scars or lesions.  \par   [de-identified] : Ambulating with a slightly antalgic to antalgic gait.  Station:  Normal.  [de-identified] : Appearance:  Well-developed, well-nourished male in no acute distress.\par \par

## 2019-11-21 NOTE — ADDENDUM
[FreeTextEntry1] : This note was written by Bonnie Chan on 11/20/2019 acting as a scribe for ROMAINE GARCIA III, MD

## 2019-11-21 NOTE — DISCUSSION/SUMMARY
[de-identified] : At this time, since the patient is doing well with the resolving fracture of the medial femoral condyle of the right knee, he will return to full activities.  He will discontinue his brace.  He will follow up on an as needed basis.

## 2019-12-05 ENCOUNTER — APPOINTMENT (OUTPATIENT)
Dept: GASTROENTEROLOGY | Facility: CLINIC | Age: 64
End: 2019-12-05
Payer: MEDICARE

## 2019-12-05 VITALS
DIASTOLIC BLOOD PRESSURE: 88 MMHG | HEART RATE: 63 BPM | BODY MASS INDEX: 28.44 KG/M2 | HEIGHT: 72 IN | SYSTOLIC BLOOD PRESSURE: 155 MMHG | WEIGHT: 210 LBS

## 2019-12-05 DIAGNOSIS — K63.5 POLYP OF COLON: ICD-10-CM

## 2019-12-05 PROCEDURE — 99203 OFFICE O/P NEW LOW 30 MIN: CPT

## 2019-12-05 RX ORDER — SODIUM SULFATE, POTASSIUM SULFATE, MAGNESIUM SULFATE 17.5; 3.13; 1.6 G/ML; G/ML; G/ML
17.5-3.13-1.6 SOLUTION, CONCENTRATE ORAL
Qty: 1 | Refills: 0 | Status: ACTIVE | COMMUNITY
Start: 2019-12-05 | End: 1900-01-01

## 2019-12-05 RX ORDER — ASPIRIN 325 MG/1
325 TABLET ORAL
Qty: 21 | Refills: 0 | Status: ACTIVE | COMMUNITY
Start: 2019-07-24

## 2019-12-05 RX ORDER — LOSARTAN POTASSIUM 50 MG/1
50 TABLET, FILM COATED ORAL
Qty: 90 | Refills: 0 | Status: ACTIVE | COMMUNITY
Start: 2019-09-20

## 2019-12-05 RX ORDER — LOSARTAN POTASSIUM 100 MG/1
100 TABLET, FILM COATED ORAL
Qty: 90 | Refills: 0 | Status: ACTIVE | COMMUNITY
Start: 2019-10-18

## 2019-12-05 NOTE — HISTORY OF PRESENT ILLNESS
[FreeTextEntry1] : Patient has a history of colon polyps here for followup colonoscopy he denies any change of bowel habits or rectal bleeding he does have from time to time epigastric burning especially with exercise the cardiac and pulmonary workup or unrevealing he denies dysphagia or significant alcohol use he denies significant nonsteroidal anti-inflammatory drug use but has excessive belching

## 2019-12-05 NOTE — PHYSICAL EXAM
[General Appearance - In No Acute Distress] : in no acute distress [General Appearance - Alert] : alert [Sclera] : the sclera and conjunctiva were normal [PERRL With Normal Accommodation] : pupils were equal in size, round, and reactive to light [Outer Ear] : the ears and nose were normal in appearance [Extraocular Movements] : extraocular movements were intact [Oropharynx] : the oropharynx was normal [Neck Appearance] : the appearance of the neck was normal [Neck Cervical Mass (___cm)] : no neck mass was observed [Jugular Venous Distention Increased] : there was no jugular-venous distention [Thyroid Diffuse Enlargement] : the thyroid was not enlarged [Thyroid Nodule] : there were no palpable thyroid nodules [Heart Rate And Rhythm] : heart rate was normal and rhythm regular [Heart Sounds] : normal S1 and S2 [Auscultation Breath Sounds / Voice Sounds] : lungs were clear to auscultation bilaterally [Heart Sounds Gallop] : no gallops [Murmurs] : no murmurs [Bowel Sounds] : normal bowel sounds [Heart Sounds Pericardial Friction Rub] : no pericardial rub [Abdomen Soft] : soft [Abdomen Mass (___ Cm)] : no abdominal mass palpated [Abdomen Tenderness] : non-tender [Cervical Lymph Nodes Enlarged Posterior Bilaterally] : posterior cervical [Cervical Lymph Nodes Enlarged Anterior Bilaterally] : anterior cervical [Supraclavicular Lymph Nodes Enlarged Bilaterally] : supraclavicular [Axillary Lymph Nodes Enlarged Bilaterally] : axillary [Abnormal Walk] : normal gait [Femoral Lymph Nodes Enlarged Bilaterally] : femoral [Inguinal Lymph Nodes Enlarged Bilaterally] : inguinal [Nail Clubbing] : no clubbing  or cyanosis of the fingernails [Musculoskeletal - Swelling] : no joint swelling seen [Skin Color & Pigmentation] : normal skin color and pigmentation [Motor Tone] : muscle strength and tone were normal [Skin Turgor] : normal skin turgor [] : no rash [Deep Tendon Reflexes (DTR)] : deep tendon reflexes were 2+ and symmetric [Sensation] : the sensory exam was normal to light touch and pinprick [Oriented To Time, Place, And Person] : oriented to person, place, and time [No Focal Deficits] : no focal deficits [Impaired Insight] : insight and judgment were intact [Affect] : the affect was normal

## 2019-12-05 NOTE — ASSESSMENT
[FreeTextEntry1] : #1 history of colon polyps we'll schedule colonoscopy #2 epigastric burning discomfort we'll schedule upper endoscopy the same time

## 2019-12-11 ENCOUNTER — APPOINTMENT (OUTPATIENT)
Dept: GASTROENTEROLOGY | Facility: AMBULATORY MEDICAL SERVICES | Age: 64
End: 2019-12-11
Payer: MEDICARE

## 2019-12-11 ENCOUNTER — RESULT REVIEW (OUTPATIENT)
Age: 64
End: 2019-12-11

## 2019-12-11 PROCEDURE — 45382 COLONOSCOPY W/CONTROL BLEED: CPT | Mod: 59

## 2019-12-11 PROCEDURE — 45385 COLONOSCOPY W/LESION REMOVAL: CPT

## 2019-12-11 PROCEDURE — 45380 COLONOSCOPY AND BIOPSY: CPT | Mod: 59

## 2019-12-11 PROCEDURE — 43239 EGD BIOPSY SINGLE/MULTIPLE: CPT

## 2021-09-30 NOTE — HISTORY OF PRESENT ILLNESS
[de-identified] : The patient comes in today for his right knee.  He is starting to feel a little bit better.  We reviewed his MRI, as noted below.
No

## 2021-12-07 ENCOUNTER — APPOINTMENT (OUTPATIENT)
Dept: INTERNAL MEDICINE | Facility: CLINIC | Age: 66
End: 2021-12-07

## 2022-04-28 ENCOUNTER — APPOINTMENT (OUTPATIENT)
Dept: ORTHOPEDIC SURGERY | Facility: CLINIC | Age: 67
End: 2022-04-28
Payer: MEDICARE

## 2022-04-28 VITALS — HEIGHT: 72 IN | BODY MASS INDEX: 28.44 KG/M2 | WEIGHT: 210 LBS

## 2022-04-28 DIAGNOSIS — M75.01 ADHESIVE CAPSULITIS OF RIGHT SHOULDER: ICD-10-CM

## 2022-04-28 PROCEDURE — 99214 OFFICE O/P EST MOD 30 MIN: CPT

## 2022-04-28 PROCEDURE — 73030 X-RAY EXAM OF SHOULDER: CPT | Mod: RT

## 2022-04-28 NOTE — HISTORY OF PRESENT ILLNESS
[de-identified] : The patient presents today with continued complaints of right shoulder pain. He had a RCR 20 years ago and always had a degree of decreased ROM but for the past year he has significant pain that is interfering with his activities. NSAIDs help, he takes them sparingly. He has previously done physical therapy and is doing a HEP 4-5 days a week. In the past he has tried cortisone injections and oral steroids without relief of symptoms.

## 2022-04-28 NOTE — PHYSICAL EXAM
[5 ___] : forward flexion 5[unfilled]/5 [4___] : external rotation 4[unfilled]/5 [5___] : internal rotation 5[unfilled]/5 [de-identified] : Constitutional: The general appearance of the patient is well developed, well nourished, no deformities and well groomed. Normal\par \par  Gait: Gait and function is as follows: normal gait.\par \par  Skin: Head and neck visualized skin is normal. Left upper extremity visualized skin is normal. Right upper extremity visualized skin is normal. \par \par  Cardiovascular: palpable radial pulse bilaterally. [Supine] : supine [Standing] : standing [Moderate] : moderate [] : negative drop-arm test [Right] : right shoulder [FreeTextEntry8] : No tenderness at the AC joint, biceps tendon or acromion  [FreeTextEntry9] : passive IR at 90 degrees is 50 degrees\par passive ER at 90 degrees is 20 degrees\par passive ER at 90 abduction is 10 [de-identified] : supraspinatus 4 out of 5 with pain [de-identified] : + DAVID's\par  [FreeTextEntry1] : Right shoulder: Two labral vs RCR anchors intact. 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 No fractures.  [TWNoteComboBox7] : active forward flexion 120 degrees [TWNoteComboBox4] : passive forward flexion 100 degrees [de-identified] : active abduction 90 degrees [TWNoteComboBox9] : passive abduction 90 degrees [TWNoteComboBox6] : internal rotation L3 [de-identified] : external rotation 30 degrees [de-identified] : False

## 2022-04-28 NOTE — REASON FOR VISIT
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 He has no evidence of recurrent RCT\par The patient is also s/p a left shoulder two anchor rotator cuff repair with subacromial decompression and arthroscopic extensive debridement of the anterior and superior labrum from 5/8/19.

## 2022-04-28 NOTE — DISCUSSION/SUMMARY
[de-identified] : The patient is right shoulder adhesive capsulitis in the frozen stage. He has mild glenohumeral joint osteoarthritis of the right shoulder. \par The patient is also s/p a left shoulder two anchor rotator cuff repair with subacromial decompression and arthroscopic extensive debridement of the anterior and superior labrum from 5/8/19.\par \par The diagnosis and treatments were discussed.\par The patient's primary issue is pain both at rest and with use. \par He has tried oral AIs, prednisone, steroid injections without relief. \par He had a recent CT scan of his right shoulder (Nov. 2021.)\par We briefly discussed pancapsular release surgery and he will return to discuss further with Dr. Erickson.

## 2022-04-29 ENCOUNTER — TRANSCRIPTION ENCOUNTER (OUTPATIENT)
Age: 67
End: 2022-04-29

## 2022-05-12 ENCOUNTER — APPOINTMENT (OUTPATIENT)
Dept: ORTHOPEDIC SURGERY | Facility: CLINIC | Age: 67
End: 2022-05-12
Payer: MEDICARE

## 2022-05-12 PROCEDURE — 99214 OFFICE O/P EST MOD 30 MIN: CPT | Mod: 25

## 2022-05-12 PROCEDURE — 20611 DRAIN/INJ JOINT/BURSA W/US: CPT | Mod: RT

## 2022-05-12 NOTE — PHYSICAL EXAM
[Supine] : supine [Standing] : standing [Moderate] : moderate [4___] : external rotation 4[unfilled]/5 [5___] : internal rotation 5[unfilled]/5 [Right] : right shoulder [de-identified] : Constitutional: The general appearance of the patient is well developed, well nourished, no deformities and well groomed. Normal\par \par  Gait: Gait and function is as follows: normal gait.\par \par  Skin: Head and neck visualized skin is normal. Left upper extremity visualized skin is normal. Right upper extremity visualized skin is normal. \par \par  Cardiovascular: palpable radial pulse bilaterally. [] : negative drop-arm test [FreeTextEntry8] : supra not TTP. [FreeTextEntry9] : passive IR at 90 degrees is 50 degrees\par passive ER at 90 degrees is 20 degrees\par passive ER at 90 abduction is 10 [de-identified] : supraspinatus 4 out of 5  [de-identified] : + DAVID's\par  [FreeTextEntry1] : 4/22/22 displaced os acromial fracture. metal hardware intact. mild GHOA [TWNoteComboBox7] : active forward flexion 110 degrees [TWNoteComboBox4] : passive forward flexion 100 degrees [de-identified] : active abduction 90 degrees [TWNoteComboBox9] : passive abduction 90 degrees [TWNoteComboBox6] : internal rotation L1 [de-identified] : external rotation 20 degrees

## 2022-05-12 NOTE — PROCEDURE
[Large Joint Injection] : Large joint injection [Right] : of the right [Glenohumeral Joint] : glenohumeral joint [Euflexxa] : Euflexxa [#1] : series #1 [Pain] : pain [X-ray evidence of Osteoarthritis on this or prior visit] : x-ray evidence of Osteoarthritis on this or prior visit [Betadine] : betadine [] : Patient tolerated procedure well [Call if redness, pain or fever occur] : call if redness, pain or fever occur [Apply ice for 15min out of every hour for the next 12-24 hours as tolerated] : apply ice for 15 minutes out of every hour for the next 12-24 hours as tolerated [Patient was advised to rest the joint(s) for ____ days] : patient was advised to rest the joint(s) for [unfilled] days [Risks, benefits, alternatives discussed / Verbal consent obtained] : the risks benefits, and alternatives have been discussed, and verbal consent was obtained [Glenohmeral injection] : glenohumeral injection [All ultrasound images have been permanently captured and stored accordingly in our picture archiving and communication system] : All ultrasound images have been permanently captured and stored accordingly in our picture archiving and communication system [Visualization of the needle and placement of injection was performed without complication] : visualization of the needle and placement of injection was performed without complication

## 2022-05-12 NOTE — DISCUSSION/SUMMARY
[de-identified] : The patient is right shoulder s/p RCR by Dr. Stewart about 2001. Patient now has has mild glenohumeral joint osteoarthritis of the right shoulder without evidence of recurrent rotator cuff repair as per CT arthrogram from August 2021. \par The patient is also s/p a left shoulder two anchor rotator cuff repair with subacromial decompression and arthroscopic extensive debridement of the anterior and superior labrum from 5/8/19.\par \par For the right shoulder:\par Patient has restricted ROM and pain in the shoulder.\par Options of capsular release and TSR were discussed but he would like to hold off on Sx at this time \par \par The patient chooses to proceed non operatively at this time \par \par The patient was advised to pursue acupuncture for pain relief in the shoulder  \par The patient will begin HA injections today for the right shoulder\par The patient will follow up in 1 and 2 weeks to continue HA injections \par  \par If patient has relief from HA injections and if considering Sx, we would need an updated CT scan before proceeding with any surgery\par 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 Ritchie Erickson, attest that this documentation was recorded by the scribe, in my presence, and that it accurately reflects the service I personally performed, and the decisions made by me with my edits as appropriate.\par \par I, Artie Richardson, acting as scribe, attest that this documentation has been prepared under the direction and in the presence of Provider Ritchie Erickson MD.\par

## 2022-05-12 NOTE — HISTORY OF PRESENT ILLNESS
[de-identified] : The patient presents today with continued complaints of right shoulder pain. He had a RCR 20 years ago with Dr. Stewart and always had a degree of decreased ROM but for the past year he has significant pain that is interfering with his activities. NSAIDs help, he takes them sparingly. He has previously done physical therapy and is doing a HEP 4-5 days a week. In the past he has tried cortisone injections and oral steroids without relief of symptoms. Patient is unable to sleep due to pain.

## 2022-05-19 ENCOUNTER — APPOINTMENT (OUTPATIENT)
Dept: ORTHOPEDIC SURGERY | Facility: CLINIC | Age: 67
End: 2022-05-19

## 2022-05-26 ENCOUNTER — APPOINTMENT (OUTPATIENT)
Dept: ORTHOPEDIC SURGERY | Facility: CLINIC | Age: 67
End: 2022-05-26
Payer: MEDICARE

## 2022-05-26 VITALS — WEIGHT: 211 LBS | HEIGHT: 72 IN | BODY MASS INDEX: 28.58 KG/M2

## 2022-05-26 DIAGNOSIS — Z98.890 OTHER SPECIFIED POSTPROCEDURAL STATES: ICD-10-CM

## 2022-05-26 DIAGNOSIS — M25.511 PAIN IN RIGHT SHOULDER: ICD-10-CM

## 2022-05-26 PROCEDURE — 76881 US COMPL JOINT R-T W/IMG: CPT | Mod: RT

## 2022-05-26 PROCEDURE — 20610 DRAIN/INJ JOINT/BURSA W/O US: CPT

## 2022-05-26 NOTE — PROCEDURE
[Large Joint Injection] : Large joint injection [Right] : of the right [Glenohumeral Joint] : glenohumeral joint [Pain] : pain [X-ray evidence of Osteoarthritis on this or prior visit] : x-ray evidence of Osteoarthritis on this or prior visit [Betadine] : betadine [Euflexxa] : Euflexxa [] : Patient tolerated procedure well [Call if redness, pain or fever occur] : call if redness, pain or fever occur [Apply ice for 15min out of every hour for the next 12-24 hours as tolerated] : apply ice for 15 minutes out of every hour for the next 12-24 hours as tolerated [Patient was advised to rest the joint(s) for ____ days] : patient was advised to rest the joint(s) for [unfilled] days [Risks, benefits, alternatives discussed / Verbal consent obtained] : the risks benefits, and alternatives have been discussed, and verbal consent was obtained [Glenohmeral injection] : glenohumeral injection [All ultrasound images have been permanently captured and stored accordingly in our picture archiving and communication system] : All ultrasound images have been permanently captured and stored accordingly in our picture archiving and communication system [Visualization of the needle and placement of injection was performed without complication] : visualization of the needle and placement of injection was performed without complication [#2] : series #2 [Previous OTC use and PT nontherapeutic] : patient has tried OTC's including aspirin, Ibuprofen, Aleve, etc or prescription NSAIDS, and/or exercises at home and/or physical therapy without satisfactory response [Patient had decreased mobility in the joint] : patient had decreased mobility in the joint

## 2022-05-26 NOTE — HISTORY OF PRESENT ILLNESS
[de-identified] : The patient is here to continue euflexxa injections. He had a RCR 20 years ago with Dr. Stewart and always had a degree of decreased ROM but for the past year he has significant pain that is interfering with his activities. NSAIDs help, he takes them sparingly. He has previously done physical therapy and is doing a HEP 4-5 days a week. In the past he has tried cortisone injections and oral steroids without relief of symptoms. Patient is unable to sleep due to pain.

## 2022-05-26 NOTE — DISCUSSION/SUMMARY
[de-identified] : The patient is right shoulder s/p RCR by Dr. Stewart about 2001. Patient now has has mild glenohumeral joint osteoarthritis of the right shoulder without evidence of recurrent rotator cuff repair as per CT arthrogram from August 2021. \par The patient is also s/p a left shoulder two anchor rotator cuff repair with subacromial decompression and arthroscopic extensive debridement of the anterior and superior labrum from 5/8/19.\par \par The patient is here for second of three euflexxa injections. \par He tolerated it well.\par Ultrasound was used.\par He will follow up in one week. 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

## 2022-05-26 NOTE — PHYSICAL EXAM
[Supine] : supine [Standing] : standing [Moderate] : moderate [4___] : external rotation 4[unfilled]/5 [5___] : internal rotation 5[unfilled]/5 [Right] : right shoulder [de-identified] : Constitutional: The general appearance of the patient is well developed, well nourished, no deformities and well groomed. Normal\par \par  Gait: Gait and function is as follows: normal gait.\par \par  Skin: Head and neck visualized skin is normal. Left upper extremity visualized skin is normal. Right upper extremity visualized skin is normal. \par \par  Cardiovascular: palpable radial pulse bilaterally. [] : negative drop-arm test [FreeTextEntry8] : supra not TTP. [FreeTextEntry9] : passive IR at 90 degrees is 50 degrees\par passive ER at 90 degrees is 20 degrees\par passive ER at 90 abduction is 10 [de-identified] : supraspinatus 4 out of 5  [de-identified] : + DAVID's\par  [FreeTextEntry1] : 4/22/22 displaced os acromial fracture. metal hardware intact. mild GHOA [TWNoteComboBox7] : active forward flexion 110 degrees [TWNoteComboBox4] : passive forward flexion 100 degrees [de-identified] : active abduction 90 degrees [TWNoteComboBox9] : passive abduction 90 degrees [TWNoteComboBox6] : internal rotation L1 [de-identified] : external rotation 20 degrees

## 2022-06-02 ENCOUNTER — APPOINTMENT (OUTPATIENT)
Dept: ORTHOPEDIC SURGERY | Facility: CLINIC | Age: 67
End: 2022-06-02
Payer: MEDICARE

## 2022-06-02 DIAGNOSIS — M19.011 PRIMARY OSTEOARTHRITIS, RIGHT SHOULDER: ICD-10-CM

## 2022-06-02 DIAGNOSIS — M25.511 PAIN IN RIGHT SHOULDER: ICD-10-CM

## 2022-06-02 DIAGNOSIS — G89.29 PAIN IN RIGHT SHOULDER: ICD-10-CM

## 2022-06-02 PROCEDURE — 20610 DRAIN/INJ JOINT/BURSA W/O US: CPT

## 2022-06-02 PROCEDURE — 76881 US COMPL JOINT R-T W/IMG: CPT | Mod: RT,59

## 2022-06-02 NOTE — DISCUSSION/SUMMARY
[de-identified] : The patient is right shoulder s/p RCR by Dr. Stewart about 2001. Patient now has has mild glenohumeral joint osteoarthritis of the right shoulder without evidence of recurrent rotator cuff repair as per CT arthrogram from August 2021. \par The patient is also s/p a left shoulder two anchor rotator cuff repair with subacromial decompression and arthroscopic extensive debridement of the anterior and superior labrum from 5/8/19.\par \par The patient is here for third of three euflexxa injections. \par He tolerated it well.\par Ultrasound was used.\par He will follow up in 8 weeks or as needed. 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

## 2022-06-02 NOTE — HISTORY OF PRESENT ILLNESS
[de-identified] : The patient is here to continue euflexxa injections. He had a RCR 20 years ago with Dr. Stewart and always had a degree of decreased ROM but for the past year he has significant pain that is interfering with his activities. NSAIDs help, he takes them sparingly. He has previously done physical therapy and is doing a HEP 4-5 days a week. In the past he has tried cortisone injections and oral steroids without relief of symptoms. Patient is unable to sleep due to pain.

## 2022-06-02 NOTE — PHYSICAL EXAM
[Supine] : supine [Standing] : standing [Moderate] : moderate [4___] : external rotation 4[unfilled]/5 [5___] : internal rotation 5[unfilled]/5 [Right] : right shoulder [de-identified] : Constitutional: The general appearance of the patient is well developed, well nourished, no deformities and well groomed. Normal\par \par  Gait: Gait and function is as follows: normal gait.\par \par  Skin: Head and neck visualized skin is normal. Left upper extremity visualized skin is normal. Right upper extremity visualized skin is normal. \par \par  Cardiovascular: palpable radial pulse bilaterally. [] : negative drop-arm test [FreeTextEntry8] : supra not TTP. [FreeTextEntry9] : passive IR at 90 degrees is 50 degrees\par passive ER at 90 degrees is 20 degrees\par passive ER at 90 abduction is 10 [de-identified] : supraspinatus 4 out of 5  [de-identified] : + DAVID's\par  [FreeTextEntry1] : 4/22/22 displaced os acromial fracture. metal hardware intact. mild GHOA [TWNoteComboBox7] : active forward flexion 110 degrees [TWNoteComboBox4] : passive forward flexion 100 degrees [de-identified] : active abduction 90 degrees [TWNoteComboBox9] : passive abduction 90 degrees [TWNoteComboBox6] : internal rotation L1 [de-identified] : external rotation 20 degrees

## 2022-06-02 NOTE — PROCEDURE
[Large Joint Injection] : Large joint injection [Right] : of the right [Glenohumeral Joint] : glenohumeral joint [Pain] : pain [X-ray evidence of Osteoarthritis on this or prior visit] : x-ray evidence of Osteoarthritis on this or prior visit [Betadine] : betadine [Euflexxa] : Euflexxa [] : Patient tolerated procedure well [Call if redness, pain or fever occur] : call if redness, pain or fever occur [Apply ice for 15min out of every hour for the next 12-24 hours as tolerated] : apply ice for 15 minutes out of every hour for the next 12-24 hours as tolerated [Patient was advised to rest the joint(s) for ____ days] : patient was advised to rest the joint(s) for [unfilled] days [Previous OTC use and PT nontherapeutic] : patient has tried OTC's including aspirin, Ibuprofen, Aleve, etc or prescription NSAIDS, and/or exercises at home and/or physical therapy without satisfactory response [Patient had decreased mobility in the joint] : patient had decreased mobility in the joint [Risks, benefits, alternatives discussed / Verbal consent obtained] : the risks benefits, and alternatives have been discussed, and verbal consent was obtained [Glenohmeral injection] : glenohumeral injection [All ultrasound images have been permanently captured and stored accordingly in our picture archiving and communication system] : All ultrasound images have been permanently captured and stored accordingly in our picture archiving and communication system [Visualization of the needle and placement of injection was performed without complication] : visualization of the needle and placement of injection was performed without complication [#3] : series #3